# Patient Record
Sex: FEMALE
[De-identification: names, ages, dates, MRNs, and addresses within clinical notes are randomized per-mention and may not be internally consistent; named-entity substitution may affect disease eponyms.]

---

## 2020-10-21 ENCOUNTER — RX ONLY (RX ONLY)
Age: 45
End: 2020-10-21

## 2020-12-16 ENCOUNTER — APPOINTMENT (OUTPATIENT)
Dept: URBAN - METROPOLITAN AREA SURGERY 9 | Age: 45
Setting detail: DERMATOLOGY
End: 2020-12-16

## 2020-12-16 PROBLEM — C44.311 BASAL CELL CARCINOMA OF SKIN OF NOSE: Status: ACTIVE | Noted: 2020-12-16

## 2020-12-16 PROCEDURE — OTHER CONSULTATION FOR MOHS SURGERY: OTHER

## 2020-12-16 PROCEDURE — 17311 MOHS 1 STAGE H/N/HF/G: CPT

## 2020-12-16 PROCEDURE — OTHER MOHS SURGERY: OTHER

## 2020-12-16 PROCEDURE — OTHER RETURN TO REFERRING PROVIDER: OTHER

## 2020-12-16 PROCEDURE — 12052 INTMD RPR FACE/MM 2.6-5.0 CM: CPT

## 2020-12-16 NOTE — PROCEDURE: MOHS SURGERY
Post-Care Instructions: Absorbable/no tape,Wound care provided and reviewed; specific handout given.

## 2020-12-16 NOTE — PROCEDURE: CONSULTATION FOR MOHS SURGERY
Detail Level: Detailed
Body Location Override (Optional - Billing Will Still Be Based On Selected Body Map Location If Applicable): right nasal sidewall
Anatomic Location From Referring Provider: right dorsum (bridge) of nose
Name Of The Referring Provider For Procedure: Dr. Gusman
X Size Of Lesion In Cm (Optional): 0
Incorporate Mauc In Note: Yes

## 2020-12-16 NOTE — PROCEDURE: MOHS SURGERY
Yes Brow Lift Text: A midfrontal incision was made medially to the defect to allow access to the tissues just superior to the left eyebrow. Following careful dissection inferiorly in a supraperiosteal plane to the level of the left eyebrow, several 3-0 monocryl sutures were used to resuspend the eyebrow orbicularis oculi muscular unit to the superior frontal bone periosteum. This resulted in an appropriate reapproximation of static eyebrow symmetry and correction of the left brow ptosis.

## 2020-12-16 NOTE — HPI: MOHS SURGERY CONSULTATION
Has The Cancer Been Biopsied Before?: has been previously biopsied
Body Location Override (Optional): right nose
Who Is Your Referring Provider?: Dr. Gusman
Year Removed: 1900

## 2020-12-16 NOTE — PROCEDURE: MOHS SURGERY
[No] : In the past 12 months have you used drugs other than those required for medical reasons? No [No falls in past year] : Patient reported no falls in the past year [0] : 2) Feeling down, depressed, or hopeless: Not at all (0) [Patient reported mammogram was normal] : Patient reported mammogram was normal [Patient declined PAP Smear] : Patient declined PAP Smear [Patient reported bone density results were normal] : Patient reported bone density results were normal [Patient reported colonoscopy was normal] : Patient reported colonoscopy was normal [Hepatitis C test declined] : Hepatitis C test declined [HIV test declined] : HIV test declined [Learning/Retaining New Information] : difficulty learning/retaining new information [None] : None [Alone] : lives alone [With Family] : lives with family [Unemployed] : unemployed [Single] : single [Feels Safe at Home] : Feels safe at home [Fully functional (bathing, dressing, toileting, transferring, walking, feeding)] : Fully functional (bathing, dressing, toileting, transferring, walking, feeding) [Fully functional (using the telephone, shopping, preparing meals, housekeeping, doing laundry, using] : Fully functional and needs no help or supervision to perform IADLs (using the telephone, shopping, preparing meals, housekeeping, doing laundry, using transportation, managing medications and managing finances) [Independent] : managing medications [With Patient/Caregiver] : With Patient/Caregiver [Designated Healthcare Proxy] : Designated healthcare proxy [Name: ___] : Health Care Proxy's Name: [unfilled]  [Relationship: ___] : Relationship: [unfilled] [Aggressive treatment] : aggressive treatment [] : No [[id number]] : 0 [Language] : denies difficulty with language [Behavior] : denies difficulty with behavior [Change in mental status noted] : No change in mental status noted [Spatial Ability and Orientation] : denies difficulty with spatial ability and orientation [Reasoning] : denies difficulty with reasoning [Handling Complex Tasks] : denies difficulty handling complex tasks [PapSmearDate] : many yars ago [Sexually Active] : not sexually active [MammogramDate] : 03/2017 [BoneDensityDate] : 03/2017 [ColonoscopyDate] : many years ago [ColonoscopyComments] : patient will go today for GI  apt.  [AdvancecareDate] : 11/2019 Mauc Instructions: By selecting yes to the question below the MAUC number will be added into the note.  This will be calculated automatically based on the diagnosis chosen, the size entered, the body zone selected (H,M,L) and the specific indications you chose. You will also have the option to override the Mohs AUC if you disagree with the automatically calculated number and this option is found in the Case Summary tab.

## 2022-09-07 ENCOUNTER — RX ONLY (RX ONLY)
Age: 47
End: 2022-09-07

## 2022-09-07 RX ORDER — TRIAMCINOLONE ACETONIDE 1 MG/G
CREAM TOPICAL
Qty: 80 | Refills: 0 | Status: ERX | COMMUNITY
Start: 2022-09-07